# Patient Record
Sex: FEMALE | URBAN - METROPOLITAN AREA
[De-identification: names, ages, dates, MRNs, and addresses within clinical notes are randomized per-mention and may not be internally consistent; named-entity substitution may affect disease eponyms.]

---
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
## 2023-07-31 ENCOUNTER — OFFICE VISIT (OUTPATIENT)
Dept: GYNECOLOGY | Facility: CLINIC | Age: 23
End: 2023-07-31
Payer: COMMERCIAL

## 2023-07-31 VITALS
HEART RATE: 83 BPM | HEIGHT: 63 IN | WEIGHT: 115 LBS | SYSTOLIC BLOOD PRESSURE: 100 MMHG | DIASTOLIC BLOOD PRESSURE: 62 MMHG | BODY MASS INDEX: 20.38 KG/M2

## 2023-07-31 DIAGNOSIS — N90.89 VULVAR IRRITATION: Primary | ICD-10-CM

## 2023-07-31 PROCEDURE — 99203 OFFICE O/P NEW LOW 30 MIN: CPT | Performed by: OBSTETRICS & GYNECOLOGY

## 2023-07-31 RX ORDER — LANOLIN ALCOHOL/MO/W.PET/CERES
3 CREAM (GRAM) TOPICAL DAILY PRN
COMMUNITY

## 2023-07-31 RX ORDER — ASCORBIC ACID 500 MG
50 TABLET ORAL DAILY
COMMUNITY

## 2023-07-31 NOTE — PROGRESS NOTES
Assessment/Plan:    Conservative personal hygiene advised with the use of white bar of Dove soap. Sure swab sent. Pt advised labia may be sensitive and need continued barrier during exercise like A&D ointment. Will schedule annual exam at earliest convenience. Diagnoses and all orders for this visit:    Vulvar irritation    Other orders  -     cyanocobalamin (VITAMIN B-12) 1000 MCG tablet; Take 1,000 mcg by mouth daily  -     melatonin 3 mg; Take 3 mg by mouth daily as needed  -     Multiple Vitamin (Multi-Vitamin) tablet; Take by mouth  -     vitamin E, tocopherol, 200 units capsule; Take 200 Units by mouth daily  -     Chelated Zinc 50 MG TABS; Take 50 mg by mouth daily        Subjective:      Patient ID: Mohit Johnson is a 26 y.o. female. New pt presents for vulvar irritation with exercise for 3 years. She states she experiences irritation with cardio, either running or cycling. Resolves when exercise is over. She uses a barrier ointment during exercise which has helped. She denies a hx of diabetes, psoriasis, eczema, genital lesions. She is sexually active, monogamous, 2 years. Denies need for STI testing. Uses an "Brad soap" for personal hygiene. Denies vaginal discharge, dysuria. The following portions of the patient's history were reviewed and updated as appropriate: allergies, current medications, past family history, past medical history, past social history, past surgical history and problem list.    Review of Systems   Constitutional: Negative. HENT: Negative. Respiratory: Negative. Cardiovascular: Negative. Gastrointestinal: Negative. Endocrine: Negative. Genitourinary: Negative for difficulty urinating, dyspareunia, dysuria, frequency, menstrual problem, pelvic pain, urgency, vaginal bleeding, vaginal discharge and vaginal pain. Musculoskeletal: Negative. Skin: Negative. Neurological: Negative. Psychiatric/Behavioral: Negative.           Objective:      BP 100/62   Pulse 83   Ht 5' 3" (1.6 m)   Wt 52.2 kg (115 lb)   BMI 20.37 kg/m²          Physical Exam  Vitals and nursing note reviewed. Exam conducted with a chaperone present. Constitutional:       Appearance: Normal appearance. Pulmonary:      Effort: Pulmonary effort is normal.   Abdominal:      Hernia: There is no hernia in the left inguinal area or right inguinal area. Genitourinary:     Exam position: Supine. Pubic Area: No rash. Labia:         Right: No rash, tenderness, lesion or injury. Left: No rash, tenderness, lesion or injury. Urethra: No urethral pain, urethral swelling or urethral lesion. Vagina: No signs of injury and foreign body. Vaginal discharge (scant amount of creamy white discharge) present. No erythema, tenderness, bleeding or lesions. Cervix: No cervical motion tenderness, discharge, friability, lesion, erythema, cervical bleeding or eversion. Uterus: Not deviated, not enlarged, not fixed, not tender and no uterine prolapse. Adnexa:         Right: No mass, tenderness or fullness. Left: No mass, tenderness or fullness. Comments: Adherent yellow discharge noted between labia minora and majora B/L, otherwise no abnormality seen  Musculoskeletal:         General: Normal range of motion. Cervical back: Normal range of motion. Lymphadenopathy:      Lower Body: No right inguinal adenopathy. No left inguinal adenopathy. Skin:     General: Skin is warm and dry. Neurological:      Mental Status: She is alert and oriented to person, place, and time. Psychiatric:         Mood and Affect: Mood normal.         Behavior: Behavior normal.         Thought Content:  Thought content normal.         Judgment: Judgment normal.

## 2023-08-02 ENCOUNTER — TELEPHONE (OUTPATIENT)
Dept: GYNECOLOGY | Facility: CLINIC | Age: 23
End: 2023-08-02

## 2023-08-02 DIAGNOSIS — B37.9 CANDIDA ALBICANS INFECTION: Primary | ICD-10-CM

## 2023-08-02 LAB
BV BACTERIA RRNA VAG QL NAA+PROBE: NEGATIVE
C GLABRATA RNA VAG QL NAA+PROBE: NOT DETECTED
CANDIDA RRNA VAG QL PROBE: DETECTED
T VAGINALIS RRNA SPEC QL NAA+PROBE: NOT DETECTED

## 2023-08-02 RX ORDER — FLUCONAZOLE 150 MG/1
150 TABLET ORAL ONCE
Qty: 2 TABLET | Refills: 0 | Status: SHIPPED | OUTPATIENT
Start: 2023-08-02 | End: 2023-08-02

## 2023-08-02 NOTE — TELEPHONE ENCOUNTER
----- Message from Tyler Renner, [address] [facility] sent at 8/2/2023 12:48 PM EDT -----  Pls inform pt of yeast on sure swab. She can either treat with monistat 7 or if she would like Diflucan, we need a pharmacy.

## 2023-08-16 ENCOUNTER — TELEPHONE (OUTPATIENT)
Dept: GYNECOLOGY | Facility: CLINIC | Age: 23
End: 2023-08-16

## 2024-01-02 ENCOUNTER — OCCMED (OUTPATIENT)
Dept: URGENT CARE | Facility: CLINIC | Age: 24
End: 2024-01-02

## 2024-01-02 DIAGNOSIS — Z02.83 ENCOUNTER FOR DRUG SCREENING: Primary | ICD-10-CM
